# Patient Record
Sex: FEMALE | Race: WHITE | Employment: OTHER | URBAN - NONMETROPOLITAN AREA
[De-identification: names, ages, dates, MRNs, and addresses within clinical notes are randomized per-mention and may not be internally consistent; named-entity substitution may affect disease eponyms.]

---

## 2020-08-10 NOTE — PROGRESS NOTES
Subjective:   Chief Complaint:   Chief Complaint   Patient presents with   • HTN (Controlled)   • Hyperlipidemia         Estela Borden is a 70 y.o. female who is referred by pulmonologist, Dr. Abdifatah Gannon, for shortness of breath and cough.    She has been coughing since 2020 with running nose, referred to pulm.  Treated for sinus and post nasal drip.    Then started having leg swelling, right more than left.    Has been having SNOW, also started in Feb.  [facility] 3 SNOW. Gets chest tightness as well but no other CP.  Took lasix and K for 7 days, then PRN.  Lost 4 pounds of water weight, thinks it helped a little, helped with leg swelling.  Weight creeping back up.  No PFTs per pt.    No screening for CANDICE.    Has HTN, on med only, BP very high today.  HTN since prior to .  Intolerance to atenolol, dizzy, hearing loss, had only been taking it for 3 weeks.  Takes IB PRN, taking it only a few times per week.    Has hyperlipidemia, on simvastatin since .  in , . HDL ok.  No FU lipids.  Vit D def, on replacement.    No family history of premature coronary artery disease.  Brother  at 74, found 6 days later.  Father  at 84 of CHF.  No prior smoking history.  No history of diabetes. IFG.  No history of autoimmune disease such as lupus or rheumatoid arthritis.  No chronic kidney disease.  No ETOH overuse. Occasional.  No caffeine overuse.  No recreation substance use.    Feels neck and pulse pounding at night when she lays down, interferes with her falling asleep, not new.  No significant lightheadedness, or presyncope/syncope.   Gets vertigo.  No symptoms of leg claudication.  Cramps at night.  Had episode playing cards were fingers of left hand would not extend, was 1st through 4th digit.   No recent stroke/TIA like symptoms.        DATA REVIEWED by me:  ECG 2020  Sinus 70, PAC    Echo pending    Nuc [facility]  Told normal, requested.    Most recent labs:       Lab Results    Component Value Date/Time    HEMOGLOBIN 13.6 11/12/2015 09:03 AM    HEMATOCRIT 41.8 11/12/2015 09:03 AM    MCV 89 11/12/2015 09:03 AM      Lab Results   Component Value Date/Time    SODIUM 142 11/12/2015 09:03 AM    POTASSIUM 3.7 11/12/2015 09:03 AM    CHLORIDE 100 11/12/2015 09:03 AM    CO2 25 11/12/2015 09:03 AM    GLUCOSE 102 (H) 11/12/2015 09:03 AM    BUN 11 11/12/2015 09:03 AM    CREATININE 0.57 11/12/2015 09:03 AM      Lab Results   Component Value Date/Time    ASTSGOT 15 11/12/2015 09:03 AM    ALTSGPT 13 11/12/2015 09:03 AM    ALBUMIN 4.1 11/12/2015 09:03 AM      Lab Results   Component Value Date/Time    CHOLSTRLTOT 259 (H) 11/12/2015 09:03 AM     (H) 11/12/2015 09:03 AM    HDL 54 11/12/2015 09:03 AM    TRIGLYCERIDE 235 (H) 11/12/2015 09:03 AM           Past Medical History:   Diagnosis Date   • Abdominal cramping    • Anxiety    • Arthritis    • Asthma    • Constipation    • Depression    • Diarrhea    • Difficulty breathing    • Difficulty walking    • Excessive sweating    • Frequent urination at night    • Headache    • Heat intolerance    • Hypertension    • Insomnia    • Joint pain    • Light sensitivity    • Muscle pain    • Neck stiffness    • Numbness and tingling    • Restless sleeper    • Varicose vein      Past Surgical History:   Procedure Laterality Date   • KNEE REPLACEMENT, TOTAL  9/3/2014   • HYSTERECTOMY, TOTAL ABDOMINAL       Family History   Problem Relation Age of Onset   • Cancer Father         Bone   • Heart Disease Father         CHF   • Cancer Mother         Colon   • Arthritis Maternal Grandmother    • Stroke Maternal Grandfather    • Cancer Paternal Grandmother         Lymph node   • Diabetes Paternal Grandmother      Social History     Socioeconomic History   • Marital status:      Spouse name: Not on file   • Number of children: Not on file   • Years of education: Not on file   • Highest education level: Not on file   Occupational History   • Not on file   Social  Needs   • Financial resource strain: Not on file   • Food insecurity     Worry: Not on file     Inability: Not on file   • Transportation needs     Medical: Not on file     Non-medical: Not on file   Tobacco Use   • Smoking status: Never Smoker   • Smokeless tobacco: Never Used   Substance and Sexual Activity   • Alcohol use: Yes     Alcohol/week: 1.2 - 2.4 oz     Types: 1 - 2 Glasses of wine, 1 - 2 Shots of liquor per week     Comment: Moderately either wine or liquor   • Drug use: No   • Sexual activity: Not Currently   Lifestyle   • Physical activity     Days per week: Not on file     Minutes per session: Not on file   • Stress: Not on file   Relationships   • Social connections     Talks on phone: Not on file     Gets together: Not on file     Attends Buddhist service: Not on file     Active member of club or organization: Not on file     Attends meetings of clubs or organizations: Not on file     Relationship status: Not on file   • Intimate partner violence     Fear of current or ex partner: Not on file     Emotionally abused: Not on file     Physically abused: Not on file     Forced sexual activity: Not on file   Other Topics Concern   • Not on file   Social History Narrative   • Not on file     Allergies   Allergen Reactions   • Atenolol    • Naproxen    • Penicillins    • Sulfa Drugs        Current Outpatient Medications   Medication Sig Dispense Refill   • losartan (COZAAR) 25 MG Tab Take 25 mg by mouth.     • FLUoxetine (PROZAC) 20 MG Cap Take 20 mg by mouth.     • azelastine (ASTELIN) 137 MCG/SPRAY nasal spray USE 2 DOSES IN EACH NOSTRIL TWICE DAILY FOR 30 DAYS     • ipratropium (ATROVENT) 0.06 % Solution USE 2 SPRAY(S) IN EACH NOSTRIL TWICE DAILY FOR 30 DAYS     • furosemide (LASIX) 20 MG Tab Take 1 Tab by mouth every day. 100 Tab 3   • potassium chloride (MICRO-K) 10 MEQ capsule Take 1 Cap by mouth every day. 100 Cap 3   • simvastatin (ZOCOR) 20 MG Tab Take 1 Tab by mouth every evening. 90 Tab 1   •  ibuprofen (MOTRIN) 800 MG Tab Take 1 Tab by mouth every 8 hours as needed. 30 Tab 3   • omeprazole (PRILOSEC) 20 MG delayed-release capsule Take 1 Cap by mouth every day. 30 Cap 3   • albuterol (PROVENTIL HFA) 108 (90 BASE) MCG/ACT Aero Soln inhalation aerosol Inhale 2 Puffs by mouth every 6 hours as needed for Shortness of Breath. 8.5 g 3     No current facility-administered medications for this visit.        ROS  All others systems reviewed and negative.     Objective:     /80 (BP Location: Right arm, Patient Position: Sitting)   Pulse 74   Ht 1.524 m (5')   Wt 106.1 kg (234 lb)   SpO2 96%  Body mass index is 45.7 kg/m².    General: No acute distress. Well nourished.  HEENT: EOM grossly intact, no scleral icterus, no pharyngeal erythema.   Neck:  No JVD, no bruits, trachea midline  CVS: RRR, some ectopy. Normal S1, S2. No M/R/G. No LE edema.  2+ radial pulses, 2+ PT pulses  Resp: CTAB. No wheezing or crackles/rhonchi. Normal respiratory effort.  Abdomen: Soft, NT, no terri hepatomegaly.  MSK/Ext: No clubbing or cyanosis.  Skin: Warm and dry, no rashes.  Neurological: CN III-XII grossly intact. No focal deficits.   Psych: A&O x 3, appropriate affect, good judgement      Assessment:     1. Shortness of breath  EKG    EC-ECHOCARDIOGRAM COMPLETE W/O CONT    Basic Metabolic Panel   2. Cough     3. Essential hypertension  EKG    Basic Metabolic Panel   4. Vitamin D deficiency disease     5. Bilateral lower extremity edema  EC-ECHOCARDIOGRAM COMPLETE W/O CONT   6. Premature atrial contraction     7. IFG (impaired fasting glucose)     8. Mixed hyperlipidemia     9. Palpitations         Medical Decision Making:  Today's Assessment / Status / Plan:     -Needs echo, ordered today  -Records from [facility] requested  -Needs FU lipids and LFTs at some point  -Cont lasix scheduled with K and FU blood work  -BP not controlled, consider spironolactone  -Cough is unusual, glad she is going to an allergist  -Consider  zio for palps, had PACs  -Consider CANDICE eval  -RTC 3-4 weeks.    Written instructions given today:    -Echocardiogram- heart pictures to look at the heart structure and pump function.    -Consider Zio Patch, heart monitor, 2 weeks, evaluate the electrical system of the heart, I might ask you to do this after I see your echo    -You should always hear results of testing within 5 days with my interpretation, if you do not, send a [facility] message or call the office: [phone].    -Take lasix and potassium every day, non fasting blood work 5-7 days after to follow kidney function and potassium.    Checking Blood Pressure:  -Blood pressure cuff, spend in the $40-65, with good return policy  -It should be automatic, upper arm, measure your arm to get the correct size, probably adult Large  -Put the cuff in place, rest arm on table near height of your heart, sit quietly for 5 min, legs uncrossed, with back support, then take your blood pressure, write it down, keep a log  -Check no more than 1 time day, maybe 4-5 times per week, try different times of day.  -Can bring your cuff to at least one appointment where it can be calibrated to a manual cuff if you are concerned.  -Goal blood pressure is at least under 130/80, ideally under 120/80.  If you think your BP is overall too high, let us know in the office, we can adjust medications, can use [facility] or call the [place] office: [phone].    -Salt=sodium=sea salt, guidelines say stay under 2,500 mg daily but I ask for under 4,000 mg daily.  Get salt smart, start looking at labels, count it up.  Salt is hidden in everything, salad dressing, sauces, cheese, most canned food, any processed meat.    Return in about 4 weeks (around 9/11/2020).    It is my pleasure to participate in the care of Ms. Borden.  Please do not hesitate to contact me with questions or concerns.    Mary Tam MD, [facility]  Cardiologist [facility]    Please note  that this dictation was created using voice recognition software. I have made every reasonable attempt to correct obvious errors, but it is possible there are errors of grammar and possibly content that I did not discover before finalizing the note.

## 2020-08-14 ENCOUNTER — OFFICE VISIT (OUTPATIENT)
Dept: CARDIOLOGY | Facility: CLINIC | Age: 70
End: 2020-08-14
Payer: MEDICARE

## 2020-08-14 VITALS
DIASTOLIC BLOOD PRESSURE: 80 MMHG | SYSTOLIC BLOOD PRESSURE: 160 MMHG | HEART RATE: 74 BPM | HEIGHT: 60 IN | WEIGHT: 234 LBS | BODY MASS INDEX: 45.94 KG/M2 | OXYGEN SATURATION: 96 %

## 2020-08-14 DIAGNOSIS — E78.2 MIXED HYPERLIPIDEMIA: ICD-10-CM

## 2020-08-14 DIAGNOSIS — R60.0 BILATERAL LOWER EXTREMITY EDEMA: ICD-10-CM

## 2020-08-14 DIAGNOSIS — E55.9 VITAMIN D DEFICIENCY: ICD-10-CM

## 2020-08-14 DIAGNOSIS — R06.02 SHORTNESS OF BREATH: ICD-10-CM

## 2020-08-14 DIAGNOSIS — R00.2 PALPITATIONS: ICD-10-CM

## 2020-08-14 DIAGNOSIS — I10 ESSENTIAL HYPERTENSION: ICD-10-CM

## 2020-08-14 DIAGNOSIS — R05.9 COUGH: ICD-10-CM

## 2020-08-14 DIAGNOSIS — R73.01 IFG (IMPAIRED FASTING GLUCOSE): ICD-10-CM

## 2020-08-14 DIAGNOSIS — I49.1 PREMATURE ATRIAL CONTRACTION: ICD-10-CM

## 2020-08-14 LAB — EKG IMPRESSION: NORMAL

## 2020-08-14 PROCEDURE — 93000 ELECTROCARDIOGRAM COMPLETE: CPT | Performed by: INTERNAL MEDICINE

## 2020-08-14 PROCEDURE — 99204 OFFICE O/P NEW MOD 45 MIN: CPT | Performed by: INTERNAL MEDICINE

## 2020-08-14 RX ORDER — AZELASTINE 1 MG/ML
SPRAY, METERED NASAL
COMMUNITY
Start: 2020-05-31

## 2020-08-14 RX ORDER — POTASSIUM CHLORIDE 750 MG/1
10 CAPSULE, EXTENDED RELEASE ORAL DAILY
Qty: 100 CAP | Refills: 3 | Status: SHIPPED | OUTPATIENT
Start: 2020-08-14 | End: 2020-09-08 | Stop reason: SDUPTHER

## 2020-08-14 RX ORDER — FUROSEMIDE 20 MG/1
TABLET ORAL
COMMUNITY
Start: 2020-07-23 | End: 2020-08-14

## 2020-08-14 RX ORDER — IPRATROPIUM BROMIDE 42 UG/1
SPRAY, METERED NASAL
COMMUNITY
Start: 2020-07-10 | End: 2024-01-02 | Stop reason: SDUPTHER

## 2020-08-14 RX ORDER — POTASSIUM CHLORIDE 750 MG/1
TABLET, FILM COATED, EXTENDED RELEASE ORAL
COMMUNITY
Start: 2020-08-08 | End: 2020-08-14

## 2020-08-14 RX ORDER — FLUOXETINE HYDROCHLORIDE 20 MG/1
20 CAPSULE ORAL
COMMUNITY
Start: 2020-05-23 | End: 2023-06-20

## 2020-08-14 RX ORDER — FUROSEMIDE 20 MG/1
20 TABLET ORAL DAILY
Qty: 100 TAB | Refills: 3 | Status: SHIPPED | OUTPATIENT
Start: 2020-08-14 | End: 2021-08-17 | Stop reason: SDUPTHER

## 2020-08-14 RX ORDER — LOSARTAN POTASSIUM 25 MG/1
25 TABLET ORAL
COMMUNITY
Start: 2020-06-01 | End: 2020-09-08

## 2020-08-14 NOTE — PATIENT INSTRUCTIONS
-Echocardiogram- heart pictures to look at the heart structure and pump function.    -Consider Zio Patch, heart monitor, 2 weeks, evaluate the electrical system of the heart, I might ask you to do this after I see your echo    -You should always hear results of testing within 5 days with my interpretation, if you do not, send a [employer] message or call the office: [phone].    -Take lasix and potassium every day, non fasting blood work 5-7 days after to follow kidney function and potassium.    Checking Blood Pressure:  -Blood pressure cuff, spend in the $40-65, with good return policy  -It should be automatic, upper arm, measure your arm to get the correct size, probably adult Large  -Put the cuff in place, rest arm on table near height of your heart, sit quietly for 5 min, legs uncrossed, with back support, then take your blood pressure, write it down, keep a log  -Check no more than 1 time day, maybe 4-5 times per week, try different times of day.  -Can bring your cuff to at least one appointment where it can be calibrated to a manual cuff if you are concerned.  -Goal blood pressure is at least under 130/80, ideally under 120/80.  If you think your BP is overall too high, let us know in the office, we can adjust medications, can use [employer] or call the [place] office: [phone].    -Salt=sodium=sea salt, guidelines say stay under 2,500 mg daily but I ask for under 4,000 mg daily.  Get salt smart, start looking at labels, count it up.  Salt is hidden in everything, salad dressing, sauces, cheese, most canned food, any processed meat.

## 2020-08-14 NOTE — LETTER
[facility] [address],   [address] [place] [address]  Phone: [phone]  Fax: [phone]              Estela Borden  1950    Encounter Date: 2020    Mary Tam M.D.          PROGRESS NOTE:  Subjective:   Chief Complaint:   Chief Complaint   Patient presents with   • HTN (Controlled)   • Hyperlipidemia         Estela Borden is a 70 y.o. female who is referred by pulmonologist, Dr. Abdifatah Gannon, for shortness of breath and cough.    She has been coughing since 2020 with running nose, referred to pulm.  Treated for sinus and post nasal drip.    Then started having leg swelling, right more than left.    Has been having SNOW, also started in Feb.  [facility] 3 SNOW. Gets chest tightness as well but no other CP.  Took lasix and K for 7 days, then PRN.  Lost 4 pounds of water weight, thinks it helped a little, helped with leg swelling.  Weight creeping back up.  No PFTs per pt.    No screening for CANDICE.    Has HTN, on med only, BP very high today.  HTN since prior to .  Intolerance to atenolol, dizzy, hearing loss, had only been taking it for 3 weeks.  Takes IB PRN, taking it only a few times per week.    Has hyperlipidemia, on simvastatin since .  in , . HDL ok.  No FU lipids.  Vit D def, on replacement.    No family history of premature coronary artery disease.  Brother  at 74, found 6 days later.  Father  at 84 of CHF.  No prior smoking history.  No history of diabetes. IFG.  No history of autoimmune disease such as lupus or rheumatoid arthritis.  No chronic kidney disease.  No ETOH overuse. Occasional.  No caffeine overuse.  No recreation substance use.    Feels neck and pulse pounding at night when she lays down, interferes with her falling asleep, not new.  No significant lightheadedness, or presyncope/syncope.   Gets vertigo.  No symptoms of leg claudication.  Cramps at night.  Had  episode playing cards were fingers of left hand would not extend, was 1st through 4th digit.   No recent stroke/TIA like symptoms.        DATA REVIEWED by me:  ECG 8-  Sinus 70, PAC    Echo pending    Nuc [facility]  Told normal, requested.    Most recent labs:       Lab Results   Component Value Date/Time    HEMOGLOBIN 13.6 11/12/2015 09:03 AM    HEMATOCRIT 41.8 11/12/2015 09:03 AM    MCV 89 11/12/2015 09:03 AM      Lab Results   Component Value Date/Time    SODIUM 142 11/12/2015 09:03 AM    POTASSIUM 3.7 11/12/2015 09:03 AM    CHLORIDE 100 11/12/2015 09:03 AM    CO2 25 11/12/2015 09:03 AM    GLUCOSE 102 (H) 11/12/2015 09:03 AM    BUN 11 11/12/2015 09:03 AM    CREATININE 0.57 11/12/2015 09:03 AM      Lab Results   Component Value Date/Time    ASTSGOT 15 11/12/2015 09:03 AM    ALTSGPT 13 11/12/2015 09:03 AM    ALBUMIN 4.1 11/12/2015 09:03 AM      Lab Results   Component Value Date/Time    CHOLSTRLTOT 259 (H) 11/12/2015 09:03 AM     (H) 11/12/2015 09:03 AM    HDL 54 11/12/2015 09:03 AM    TRIGLYCERIDE 235 (H) 11/12/2015 09:03 AM           Past Medical History:   Diagnosis Date   • Abdominal cramping    • Anxiety    • Arthritis    • Asthma    • Constipation    • Depression    • Diarrhea    • Difficulty breathing    • Difficulty walking    • Excessive sweating    • Frequent urination at night    • Headache    • Heat intolerance    • Hypertension    • Insomnia    • Joint pain    • Light sensitivity    • Muscle pain    • Neck stiffness    • Numbness and tingling    • Restless sleeper    • Varicose vein      Past Surgical History:   Procedure Laterality Date   • KNEE REPLACEMENT, TOTAL  9/3/2014   • HYSTERECTOMY, TOTAL ABDOMINAL       Family History   Problem Relation Age of Onset   • Cancer Father         Bone   • Heart Disease Father         CHF   • Cancer Mother         Colon   • Arthritis Maternal Grandmother    • Stroke Maternal Grandfather    • Cancer Paternal Grandmother         Lymph node   •  Diabetes Paternal Grandmother      Social History     Socioeconomic History   • Marital status:      Spouse name: Not on file   • Number of children: Not on file   • Years of education: Not on file   • Highest education level: Not on file   Occupational History   • Not on file   Social Needs   • Financial resource strain: Not on file   • Food insecurity     Worry: Not on file     Inability: Not on file   • Transportation needs     Medical: Not on file     Non-medical: Not on file   Tobacco Use   • Smoking status: Never Smoker   • Smokeless tobacco: Never Used   Substance and Sexual Activity   • Alcohol use: Yes     Alcohol/week: 1.2 - 2.4 oz     Types: 1 - 2 Glasses of wine, 1 - 2 Shots of liquor per week     Comment: Moderately either wine or liquor   • Drug use: No   • Sexual activity: Not Currently   Lifestyle   • Physical activity     Days per week: Not on file     Minutes per session: Not on file   • Stress: Not on file   Relationships   • Social connections     Talks on phone: Not on file     Gets together: Not on file     Attends Episcopalian service: Not on file     Active member of club or organization: Not on file     Attends meetings of clubs or organizations: Not on file     Relationship status: Not on file   • Intimate partner violence     Fear of current or ex partner: Not on file     Emotionally abused: Not on file     Physically abused: Not on file     Forced sexual activity: Not on file   Other Topics Concern   • Not on file   Social History Narrative   • Not on file     Allergies   Allergen Reactions   • Atenolol    • Naproxen    • Penicillins    • Sulfa Drugs        Current Outpatient Medications   Medication Sig Dispense Refill   • losartan (COZAAR) 25 MG Tab Take 25 mg by mouth.     • FLUoxetine (PROZAC) 20 MG Cap Take 20 mg by mouth.     • azelastine (ASTELIN) 137 MCG/SPRAY nasal spray USE 2 DOSES IN EACH NOSTRIL TWICE DAILY FOR 30 DAYS     • ipratropium (ATROVENT) 0.06 % Solution USE 2  SPRAY(S) IN EACH NOSTRIL TWICE DAILY FOR 30 DAYS     • furosemide (LASIX) 20 MG Tab Take 1 Tab by mouth every day. 100 Tab 3   • potassium chloride (MICRO-K) 10 MEQ capsule Take 1 Cap by mouth every day. 100 Cap 3   • simvastatin (ZOCOR) 20 MG Tab Take 1 Tab by mouth every evening. 90 Tab 1   • ibuprofen (MOTRIN) 800 MG Tab Take 1 Tab by mouth every 8 hours as needed. 30 Tab 3   • omeprazole (PRILOSEC) 20 MG delayed-release capsule Take 1 Cap by mouth every day. 30 Cap 3   • albuterol (PROVENTIL HFA) 108 (90 BASE) MCG/ACT Aero Soln inhalation aerosol Inhale 2 Puffs by mouth every 6 hours as needed for Shortness of Breath. 8.5 g 3     No current facility-administered medications for this visit.        ROS  All others systems reviewed and negative.     Objective:     /80 (BP Location: Right arm, Patient Position: Sitting)   Pulse 74   Ht 1.524 m (5')   Wt 106.1 kg (234 lb)   SpO2 96%  Body mass index is 45.7 kg/m².    General: No acute distress. Well nourished.  HEENT: EOM grossly intact, no scleral icterus, no pharyngeal erythema.   Neck:  No JVD, no bruits, trachea midline  CVS: RRR, some ectopy. Normal S1, S2. No M/R/G. No LE edema.  2+ radial pulses, 2+ PT pulses  Resp: CTAB. No wheezing or crackles/rhonchi. Normal respiratory effort.  Abdomen: Soft, NT, no terri hepatomegaly.  MSK/Ext: No clubbing or cyanosis.  Skin: Warm and dry, no rashes.  Neurological: CN III-XII grossly intact. No focal deficits.   Psych: A&O x 3, appropriate affect, good judgement      Assessment:     1. Shortness of breath  EKG    EC-ECHOCARDIOGRAM COMPLETE W/O CONT    Basic Metabolic Panel   2. Cough     3. Essential hypertension  EKG    Basic Metabolic Panel   4. Vitamin D deficiency disease     5. Bilateral lower extremity edema  EC-ECHOCARDIOGRAM COMPLETE W/O CONT   6. Premature atrial contraction     7. IFG (impaired fasting glucose)     8. Mixed hyperlipidemia     9. Palpitations         Medical Decision Making:  Today's  Assessment / Status / Plan:     -Needs echo, ordered today  -Records from [facility] requested  -Needs FU lipids and LFTs at some point  -Cont lasix scheduled with K and FU blood work  -BP not controlled, consider spironolactone  -Cough is unusual, glad she is going to an allergist  -Consider zio for palps, had PACs  -Consider CANDICE eval  -RTC 3-4 weeks.    Written instructions given today:    -Echocardiogram- heart pictures to look at the heart structure and pump function.    -Consider Zio Patch, heart monitor, 2 weeks, evaluate the electrical system of the heart, I might ask you to do this after I see your echo    -You should always hear results of testing within 5 days with my interpretation, if you do not, send a [employer] message or call the office: [phone].    -Take lasix and potassium every day, non fasting blood work 5-7 days after to follow kidney function and potassium.    Checking Blood Pressure:  -Blood pressure cuff, spend in the $40-65, with good return policy  -It should be automatic, upper arm, measure your arm to get the correct size, probably adult Large  -Put the cuff in place, rest arm on table near height of your heart, sit quietly for 5 min, legs uncrossed, with back support, then take your blood pressure, write it down, keep a log  -Check no more than 1 time day, maybe 4-5 times per week, try different times of day.  -Can bring your cuff to at least one appointment where it can be calibrated to a manual cuff if you are concerned.  -Goal blood pressure is at least under 130/80, ideally under 120/80.  If you think your BP is overall too high, let us know in the office, we can adjust medications, can use [employer] or call the [employer] office: [phone].    -Salt=sodium=sea salt, guidelines say stay under 2,500 mg daily but I ask for under 4,000 mg daily.  Get salt smart, start looking at labels, count it up.  Salt is hidden in everything, salad dressing, sauces, cheese, most canned food, any  processed meat.    Return in about 4 weeks (around 9/11/2020).    It is my pleasure to participate in the care of Ms. Borden.  Please do not hesitate to contact me with questions or concerns.    Mary Tam MD, [facility]  Cardiologist [facility]    Please note that this dictation was created using voice recognition software. I have made every reasonable attempt to correct obvious errors, but it is possible there are errors of grammar and possibly content that I did not discover before finalizing the note.      Amor Oconnell D.O.  [address] [place] [address] Dr Georges [address] [place] [id number]  Via Fax: [phone]     Abdifatah Gannon M.D.  [address]  [place] [address]  Via Fax: [phone]

## 2020-09-08 ENCOUNTER — OFFICE VISIT (OUTPATIENT)
Dept: CARDIOLOGY | Facility: CLINIC | Age: 70
End: 2020-09-08
Payer: MEDICARE

## 2020-09-08 ENCOUNTER — TELEPHONE (OUTPATIENT)
Dept: CARDIOLOGY | Facility: CLINIC | Age: 70
End: 2020-09-08

## 2020-09-08 ENCOUNTER — NON-PROVIDER VISIT (OUTPATIENT)
Dept: CARDIOLOGY | Facility: CLINIC | Age: 70
End: 2020-09-08
Attending: INTERNAL MEDICINE
Payer: MEDICARE

## 2020-09-08 VITALS
HEIGHT: 60 IN | WEIGHT: 230 LBS | DIASTOLIC BLOOD PRESSURE: 78 MMHG | HEART RATE: 78 BPM | BODY MASS INDEX: 45.16 KG/M2 | OXYGEN SATURATION: 91 % | SYSTOLIC BLOOD PRESSURE: 148 MMHG

## 2020-09-08 DIAGNOSIS — R05.9 COUGH: ICD-10-CM

## 2020-09-08 DIAGNOSIS — R06.02 SHORTNESS OF BREATH: ICD-10-CM

## 2020-09-08 DIAGNOSIS — E78.2 MIXED HYPERLIPIDEMIA: ICD-10-CM

## 2020-09-08 DIAGNOSIS — I47.10 SVT (SUPRAVENTRICULAR TACHYCARDIA) (HCC): ICD-10-CM

## 2020-09-08 DIAGNOSIS — I49.1 PREMATURE ATRIAL CONTRACTION: ICD-10-CM

## 2020-09-08 DIAGNOSIS — E55.9 VITAMIN D DEFICIENCY: ICD-10-CM

## 2020-09-08 DIAGNOSIS — R00.2 PALPITATIONS: ICD-10-CM

## 2020-09-08 DIAGNOSIS — I10 ESSENTIAL HYPERTENSION: ICD-10-CM

## 2020-09-08 DIAGNOSIS — R60.0 BILATERAL LOWER EXTREMITY EDEMA: ICD-10-CM

## 2020-09-08 DIAGNOSIS — R73.01 IFG (IMPAIRED FASTING GLUCOSE): ICD-10-CM

## 2020-09-08 PROCEDURE — 99214 OFFICE O/P EST MOD 30 MIN: CPT | Performed by: INTERNAL MEDICINE

## 2020-09-08 RX ORDER — LOSARTAN POTASSIUM 50 MG/1
50 TABLET ORAL 2 TIMES DAILY
Qty: 180 TAB | Refills: 3 | Status: SHIPPED | OUTPATIENT
Start: 2020-09-08 | End: 2020-09-10 | Stop reason: SDUPTHER

## 2020-09-08 RX ORDER — POTASSIUM CHLORIDE 750 MG/1
20 CAPSULE, EXTENDED RELEASE ORAL DAILY
Qty: 180 CAP | Refills: 3 | Status: SHIPPED | OUTPATIENT
Start: 2020-09-08 | End: 2020-09-10 | Stop reason: SDUPTHER

## 2020-09-08 RX ORDER — OMEPRAZOLE 40 MG/1
CAPSULE, DELAYED RELEASE ORAL
COMMUNITY
Start: 2020-08-16 | End: 2023-03-08 | Stop reason: SDUPTHER

## 2020-09-08 RX ORDER — LOSARTAN POTASSIUM 50 MG/1
50 TABLET ORAL
COMMUNITY
Start: 2020-08-20 | End: 2020-09-08 | Stop reason: SDUPTHER

## 2020-09-08 NOTE — PATIENT INSTRUCTIONS
-Echocardiogram- heart pictures to look at the heart structure and pump function.    -Zio Patch, heart monitor, 2 weeks, evaluate the electrical system of the heart.    -Start taking losartan 50 mg AM and PM, take 1-2 hours before med time.    -When I get the heart monitor results I will have our RN reach out to find out what your BP has been and I will probably add a new medication such as Cardizem or spironolactone (depedning on the results of the test)    -You should always hear results of testing within 5 days with my interpretation, if you do not, send a [employer] message or call the office: [phone].    -Call [phone], ask for [facility], keep them on the phone with you until you have success.      -take 2 of your potassium 10 meq at the same time daily

## 2020-09-08 NOTE — LETTER
[facility] [place] [address],   [address]nd [address]  [place] [address]  Phone: [phone]  Fax: [phone]              Estela Borden  1950    Encounter Date: 9/8/2020    Mary Tam M.D.          PROGRESS NOTE:  Subjective:   Chief Complaint:   Chief Complaint   Patient presents with   • Shortness of Breath       Estela Borden is a 70 y.o. female who returns for shortness of breath and cough, also had HTN and palpitations.    She has been coughing since Feb 2020 with running nose, referred to pulm.  Treated for sinus and post nasal drip.    Then started having leg swelling, right more than left.    Has been having SNOW, also started in Feb.  [facility] 3 SNOW. Gets chest tightness as well but no other CP.  Took lasix and K for 7 days, then PRN.  Lost 4 pounds of water weight, thinks it helped a little, helped with leg swelling.  Weight creeping back up.  No PFTs per pt.  Prior echo 2018 at [facility], new echo pending.  I asked her to take lasix daily, she did note some fluid weight loss and thinks it may have helped with her SOB.    No screening for CANDICE.    Notes when she lays down to sleep that she can hear her pulse/HR and suspects BP is up.  HR was 128 and / 103.  Then at times 190/78, .  Takes losartan in the morning.    Has HTN, on med only, BP very high today.  HTN since prior to 2007.  Intolerance to atenolol, dizzy, hearing loss, had only been taking it for 3 weeks.  Takes IB PRN, taking it only a few times per week.    Feels neck and pulse pounding at night when she lays down, interferes with her falling asleep, not new.  No significant lightheadedness, or presyncope/syncope.   Gets vertigo.  No symptoms of leg claudication.  Cramps at night.  Had episode playing cards were fingers of left hand would not extend, was 1st through 4th digit.   No recent stroke/TIA like symptoms.    Has hyperlipidemia, on simvastatin  since .  in 2015, . HDL ok.  No FU lipids.  Vit D def, on replacement.    Needs to have knee replacement.  Has not taken IB for over a week due to HTN.    No family history of premature coronary artery disease.  Brother  at 74, found 6 days later.  Father  at 84 of CHF.  No prior smoking history.  No history of diabetes. IFG.  No history of autoimmune disease such as lupus or rheumatoid arthritis.  No chronic kidney disease.  No ETOH overuse. Occasional.  No caffeine overuse.  No recreation substance use.        DATA REVIEWED by me:  ECG 2020  Sinus 70, PAC    Echo CT 18   EF 60-65%, mod, LAE AV sclerosis    Nuc  CT 18   Normal perfusion, EF 63%    Most recent labs:       Lab Results   Component Value Date/Time    HEMOGLOBIN 13.6 2015 09:03 AM    HEMATOCRIT 41.8 2015 09:03 AM    MCV 89 2015 09:03 AM      Lab Results   Component Value Date/Time    SODIUM 142 2015 09:03 AM    POTASSIUM 3.7 2015 09:03 AM    CHLORIDE 100 2015 09:03 AM    CO2 25 2015 09:03 AM    GLUCOSE 102 (H) 2015 09:03 AM    BUN 11 2015 09:03 AM    CREATININE 0.57 2015 09:03 AM      Lab Results   Component Value Date/Time    ASTSGOT 15 2015 09:03 AM    ALTSGPT 13 2015 09:03 AM    ALBUMIN 4.1 2015 09:03 AM      Lab Results   Component Value Date/Time    CHOLSTRLTOT 259 (H) 2015 09:03 AM     (H) 2015 09:03 AM    HDL 54 2015 09:03 AM    TRIGLYCERIDE 235 (H) 2015 09:03 AM           Past Medical History:   Diagnosis Date   • Abdominal cramping    • Anxiety    • Arthritis    • Asthma    • Constipation    • Depression    • Diarrhea    • Difficulty breathing    • Difficulty walking    • Excessive sweating    • Frequent urination at night    • Headache    • Heat intolerance    • Hypertension    • Insomnia    • Joint pain    • Light sensitivity    • Muscle pain    • Neck stiffness    • Numbness and tingling    •  Restless sleeper    • Varicose vein      Past Surgical History:   Procedure Laterality Date   • KNEE REPLACEMENT, TOTAL  9/3/2014   • HYSTERECTOMY, TOTAL ABDOMINAL       Family History   Problem Relation Age of Onset   • Cancer Father         Bone   • Heart Disease Father         CHF   • Cancer Mother         Colon   • Arthritis Maternal Grandmother    • Stroke Maternal Grandfather    • Cancer Paternal Grandmother         Lymph node   • Diabetes Paternal Grandmother      Social History     Socioeconomic History   • Marital status:      Spouse name: Not on file   • Number of children: Not on file   • Years of education: Not on file   • Highest education level: Not on file   Occupational History   • Not on file   Social Needs   • Financial resource strain: Not on file   • Food insecurity     Worry: Not on file     Inability: Not on file   • Transportation needs     Medical: Not on file     Non-medical: Not on file   Tobacco Use   • Smoking status: Never Smoker   • Smokeless tobacco: Never Used   Substance and Sexual Activity   • Alcohol use: Yes     Alcohol/week: 1.2 - 2.4 oz     Types: 1 - 2 Glasses of wine, 1 - 2 Shots of liquor per week     Comment: Moderately either wine or liquor   • Drug use: No   • Sexual activity: Not Currently   Lifestyle   • Physical activity     Days per week: Not on file     Minutes per session: Not on file   • Stress: Not on file   Relationships   • Social connections     Talks on phone: Not on file     Gets together: Not on file     Attends [employer] service: Not on file     Active member of club or organization: Not on file     Attends meetings of clubs or organizations: Not on file     Relationship status: Not on file   • Intimate partner violence     Fear of current or ex partner: Not on file     Emotionally abused: Not on file     Physically abused: Not on file     Forced sexual activity: Not on file   Other Topics Concern   • Not on file   Social History Narrative   • Not on  file     Allergies   Allergen Reactions   • Atenolol    • Naproxen    • Penicillins    • Sulfa Drugs        Current Outpatient Medications   Medication Sig Dispense Refill   • omeprazole (PRILOSEC) 40 MG delayed-release capsule TAKE 1 CAPSULE BY MOUTH ONCE DAILY FOR 90 DAYS     • losartan (COZAAR) 50 MG Tab Take 1 Tab by mouth 2 Times a Day. 180 Tab 3   • FLUoxetine (PROZAC) 20 MG Cap Take 20 mg by mouth.     • azelastine (ASTELIN) 137 MCG/SPRAY nasal spray USE 2 DOSES IN EACH NOSTRIL TWICE DAILY FOR 30 DAYS     • ipratropium (ATROVENT) 0.06 % Solution USE 2 SPRAY(S) IN EACH NOSTRIL TWICE DAILY FOR 30 DAYS     • furosemide (LASIX) 20 MG Tab Take 1 Tab by mouth every day. 100 Tab 3   • potassium chloride (MICRO-K) 10 MEQ capsule Take 1 Cap by mouth every day. 100 Cap 3   • simvastatin (ZOCOR) 20 MG Tab Take 1 Tab by mouth every evening. 90 Tab 1   • ibuprofen (MOTRIN) 800 MG Tab Take 1 Tab by mouth every 8 hours as needed. 30 Tab 3   • albuterol (PROVENTIL HFA) 108 (90 BASE) MCG/ACT Aero Soln inhalation aerosol Inhale 2 Puffs by mouth every 6 hours as needed for Shortness of Breath. 8.5 g 3     No current facility-administered medications for this visit.        ROS    All others systems reviewed and negative.     Objective:     /78 (BP Location: Right arm, Patient Position: Sitting)   Pulse 78   Ht 1.524 m (5')   Wt 104.3 kg (230 lb)   SpO2 91%  Body mass index is 44.92 kg/m².    General: No acute distress. Well nourished.  HEENT: EOM grossly intact, no scleral icterus, no pharyngeal erythema.   Neck:  No JVD, no bruits, trachea midline  CVS: RRR, some ectopy. Normal S1, S2. No M/R/G. No LE edema.  2+ radial pulses, 2+ PT pulses  Resp: CTAB. No wheezing or crackles/rhonchi. Normal respiratory effort.  Abdomen: Soft, NT, no terri hepatomegaly.  MSK/Ext: No clubbing or cyanosis.  Skin: Warm and dry, no rashes.  Neurological: CN III-XII grossly intact. No focal deficits.   Psych: A&O x 3, appropriate affect,  good judgement    Physical exam performed today and unchanged, except what is noted, compared to 8-      Assessment:     1. Shortness of breath     2. Essential hypertension     3. Vitamin D deficiency disease     4. Bilateral lower extremity edema     5. Premature atrial contraction     6. IFG (impaired fasting glucose)     7. Mixed hyperlipidemia     8. Palpitations  ZIO PATCH MONITOR   9. Cough         Medical Decision Making:  Today's Assessment / Status / Plan:     -Cont lasblanca scheduled with K, I have requested blood work, sounds like it may have helped with her shortness of breath.  -Zio for palps, look for rhythm change  -BP not controlled, needs to be adjusted I want her to increase her losartan to BID while I get heart monitor, may go to coreg or cardizem depending on zio, consider spironolactone  -Cough is unusual, glad she is going to an allergist  -Consider CANDICE eval  -In terms of SNOW, consider cardio pulm testing but I would not do that until her BP is controlled and other things optimized.  -Needs FU lipids and LFTs at some point  -RTC 6 weeks.    Written instructions given today:      -Echocardiogram- heart pictures to look at the heart structure and pump function.    -Zio Patch, heart monitor, 2 weeks, evaluate the electrical system of the heart.    -Start taking losartan 50 mg AM and PM, take 1-2 hours before med time.    -When I get the heart monitor results I will have our RN reach out to find out what your BP has been and I will probably add a new medication such as Cardizem or spironolactone (depedning on the results of the test)    -You should always hear results of testing within 5 days with my interpretation, if you do not, send a [employer] message or call the office: [phone].    -Call [phone], ask for ilabt, keep them on the phone with you until you have success.    Return in about 6 weeks (around 10/20/2020).    It is my pleasure to participate in the care of Ms. Borden.   Please do not hesitate to contact me with questions or concerns.    Mary Tam MD, [facility]  Cardiologist [facility]    Please note that this dictation was created using voice recognition software. I have made every reasonable attempt to correct obvious errors, but it is possible there are errors of grammar and possibly content that I did not discover before finalizing the note.      SHAUN SilvaO.  [address] [place] [address] Dr Georges [facility] [place][note 2]9  [facility] [address]  Via Fax: [phone]

## 2020-09-08 NOTE — PROGRESS NOTES
Subjective:   Chief Complaint:   Chief Complaint   Patient presents with   • Shortness of Breath       Estela Borden is a 70 y.o. female who returns for shortness of breath and cough, also had HTN and palpitations.    She has been coughing since 2020 with running nose, referred to pulm.  Treated for sinus and post nasal drip.    Then started having leg swelling, right more than left.    Has been having SNOW, also started in Feb.  [facility] 3 SNOW. Gets chest tightness as well but no other CP.  Took lasix and K for 7 days, then PRN.  Lost 4 pounds of water weight, thinks it helped a little, helped with leg swelling.  Weight creeping back up.  No PFTs per pt.  Prior echo  at [facility] [place], new echo pending.  I asked her to take lasix daily, she did note some fluid weight loss and thinks it may have helped with her SOB.    FU K 3.5 so I am increasing this.    No screening for CANDICE.    Notes when she lays down to sleep that she can hear her pulse/HR and suspects BP is up.  HR was 128 and / 103.  Then at times 190/78, .  Takes losartan in the morning.    Has HTN, on med only, BP very high today.  HTN since prior to .  Intolerance to atenolol, dizzy, hearing loss, had only been taking it for 3 weeks.  Takes IB PRN, taking it only a few times per week.    Feels neck and pulse pounding at night when she lays down, interferes with her falling asleep, not new.  No significant lightheadedness, or presyncope/syncope.   Gets vertigo.  No symptoms of leg claudication.  Cramps at night.  Had episode playing cards were fingers of left hand would not extend, was 1st through 4th digit.   No recent stroke/TIA like symptoms.    Has hyperlipidemia, on simvastatin since .  in , . HDL ok.  No FU lipids.  Vit D def, on replacement.    Needs to have knee replacement.  Has not taken IB for over a week due to HTN.    No family history of premature coronary artery disease.  Brother  at 74, found 6  days later.  Father  at 84 of CHF.  No prior smoking history.  No history of diabetes. IFG.  No history of autoimmune disease such as lupus or rheumatoid arthritis.  No chronic kidney disease.  No ETOH overuse. Occasional.  No caffeine overuse.  No recreation substance use.        DATA REVIEWED by me:  ECG 2020  Sinus 70, PAC    Echo CT 18   EF 60-65%, mod, LAE AV sclerosis    Nuc  CT 18   Normal perfusion, EF 63%    Most recent labs:       Lab Results   Component Value Date/Time    HEMOGLOBIN 13.6 2015 09:03 AM    HEMATOCRIT 41.8 2015 09:03 AM    MCV 89 2015 09:03 AM      Lab Results   Component Value Date/Time    SODIUM 142 2015 09:03 AM    POTASSIUM 3.7 2015 09:03 AM    CHLORIDE 100 2015 09:03 AM    CO2 25 2015 09:03 AM    GLUCOSE 102 (H) 2015 09:03 AM    BUN 11 2015 09:03 AM    CREATININE 0.57 2015 09:03 AM      Lab Results   Component Value Date/Time    ASTSGOT 15 2015 09:03 AM    ALTSGPT 13 2015 09:03 AM    ALBUMIN 4.1 2015 09:03 AM      Lab Results   Component Value Date/Time    CHOLSTRLTOT 259 (H) 2015 09:03 AM     (H) 2015 09:03 AM    HDL 54 2015 09:03 AM    TRIGLYCERIDE 235 (H) 2015 09:03 AM           Past Medical History:   Diagnosis Date   • Abdominal cramping    • Anxiety    • Arthritis    • Asthma    • Constipation    • Depression    • Diarrhea    • Difficulty breathing    • Difficulty walking    • Excessive sweating    • Frequent urination at night    • Headache    • Heat intolerance    • Hypertension    • Insomnia    • Joint pain    • Light sensitivity    • Muscle pain    • Neck stiffness    • Numbness and tingling    • Restless sleeper    • Varicose vein      Past Surgical History:   Procedure Laterality Date   • KNEE REPLACEMENT, TOTAL  9/3/2014   • HYSTERECTOMY, TOTAL ABDOMINAL       Family History   Problem Relation Age of Onset   • Cancer Father         Bone   • Heart  Disease Father         CHF   • Cancer Mother         Colon   • Arthritis Maternal Grandmother    • Stroke Maternal Grandfather    • Cancer Paternal Grandmother         Lymph node   • Diabetes Paternal Grandmother      Social History     Socioeconomic History   • Marital status:      Spouse name: Not on file   • Number of children: Not on file   • Years of education: Not on file   • Highest education level: Not on file   Occupational History   • Not on file   Social Needs   • Financial resource strain: Not on file   • Food insecurity     Worry: Not on file     Inability: Not on file   • Transportation needs     Medical: Not on file     Non-medical: Not on file   Tobacco Use   • Smoking status: Never Smoker   • Smokeless tobacco: Never Used   Substance and Sexual Activity   • Alcohol use: Yes     Alcohol/week: 1.2 - 2.4 oz     Types: 1 - 2 Glasses of wine, 1 - 2 Shots of liquor per week     Comment: Moderately either wine or liquor   • Drug use: No   • Sexual activity: Not Currently   Lifestyle   • Physical activity     Days per week: Not on file     Minutes per session: Not on file   • Stress: Not on file   Relationships   • Social connections     Talks on phone: Not on file     Gets together: Not on file     Attends [employer] service: Not on file     Active member of club or organization: Not on file     Attends meetings of clubs or organizations: Not on file     Relationship status: Not on file   • Intimate partner violence     Fear of current or ex partner: Not on file     Emotionally abused: Not on file     Physically abused: Not on file     Forced sexual activity: Not on file   Other Topics Concern   • Not on file   Social History Narrative   • Not on file     Allergies   Allergen Reactions   • Atenolol    • Naproxen    • Penicillins    • Sulfa Drugs        Current Outpatient Medications   Medication Sig Dispense Refill   • omeprazole (PRILOSEC) 40 MG delayed-release capsule TAKE 1 CAPSULE BY MOUTH ONCE  DAILY FOR 90 DAYS     • losartan (COZAAR) 50 MG Tab Take 1 Tab by mouth 2 Times a Day. 180 Tab 3   • potassium chloride (MICRO-K) 10 MEQ capsule Take 2 Caps by mouth every day. 180 Cap 3   • FLUoxetine (PROZAC) 20 MG Cap Take 20 mg by mouth.     • azelastine (ASTELIN) 137 MCG/SPRAY nasal spray USE 2 DOSES IN EACH NOSTRIL TWICE DAILY FOR 30 DAYS     • ipratropium (ATROVENT) 0.06 % Solution USE 2 SPRAY(S) IN EACH NOSTRIL TWICE DAILY FOR 30 DAYS     • furosemide (LASIX) 20 MG Tab Take 1 Tab by mouth every day. 100 Tab 3   • simvastatin (ZOCOR) 20 MG Tab Take 1 Tab by mouth every evening. 90 Tab 1   • ibuprofen (MOTRIN) 800 MG Tab Take 1 Tab by mouth every 8 hours as needed. 30 Tab 3   • albuterol (PROVENTIL HFA) 108 (90 BASE) MCG/ACT Aero Soln inhalation aerosol Inhale 2 Puffs by mouth every 6 hours as needed for Shortness of Breath. 8.5 g 3     No current facility-administered medications for this visit.        ROS    All others systems reviewed and negative.     Objective:     /78 (BP Location: Right arm, Patient Position: Sitting)   Pulse 78   Ht 1.524 m (5')   Wt 104.3 kg (230 lb)   SpO2 91%  Body mass index is 44.92 kg/m².    General: No acute distress. Well nourished.  HEENT: EOM grossly intact, no scleral icterus, no pharyngeal erythema.   Neck:  No JVD, no bruits, trachea midline  CVS: RRR, some ectopy. Normal S1, S2. No M/R/G. No LE edema.  2+ radial pulses, 2+ PT pulses  Resp: CTAB. No wheezing or crackles/rhonchi. Normal respiratory effort.  Abdomen: Soft, NT, no terri hepatomegaly.  MSK/Ext: No clubbing or cyanosis.  Skin: Warm and dry, no rashes.  Neurological: CN III-XII grossly intact. No focal deficits.   Psych: A&O x 3, appropriate affect, good judgement    Physical exam performed today and unchanged, except what is noted, compared to 8-      Assessment:     1. Shortness of breath     2. Essential hypertension     3. Vitamin D deficiency disease     4. Bilateral lower extremity edema      5. Premature atrial contraction     6. IFG (impaired fasting glucose)     7. Mixed hyperlipidemia     8. Palpitations  ZIO PATCH MONITOR   9. Cough         Medical Decision Making:  Today's Assessment / Status / Plan:     -Cont lasix scheduled with K, I have requested blood work, sounds like it may have helped with her shortness of breath.  -Zio for palps, look for rhythm change  -BP not controlled, needs to be adjusted I want her to increase her losartan to BID while I get heart monitor, may go to coreg or cardizem depending on zio, consider spironolactone  -Cough is unusual, glad she is going to an allergist  -Consider CANDICE eval  -In terms of SNOW, consider cardio pulm testing but I would not do that until her BP is controlled and other things optimized.  -Needs FU lipids and LFTs at some point  -RTC 6 weeks.    Written instructions given today:      -Echocardiogram- heart pictures to look at the heart structure and pump function.    -Zio Patch, heart monitor, 2 weeks, evaluate the electrical system of the heart.    -Start taking losartan 50 mg AM and PM, take 1-2 hours before med time.    -When I get the heart monitor results I will have our RN reach out to find out what your BP has been and I will probably add a new medication such as Cardizem or spironolactone (depedning on the results of the test)    -You should always hear results of testing within 5 days with my interpretation, if you do not, send a [employer] message or call the office: [phone].    -Call [phone], ask for [employer] Energy[employer], keep them on the phone with you until you have success.    -take 2 of your potassium 10 meq at the same time daily    Return in about 6 weeks (around 10/20/2020).    It is my pleasure to participate in the care of Ms. Borden.  Please do not hesitate to contact me with questions or concerns.    Mary Tam MD, [facility]  Cardiologist [facility]    Please note that this dictation was  created using voice recognition software. I have made every reasonable attempt to correct obvious errors, but it is possible there are errors of grammar and possibly content that I did not discover before finalizing the note.

## 2020-09-10 RX ORDER — LOSARTAN POTASSIUM 50 MG/1
50 TABLET ORAL 2 TIMES DAILY
Qty: 180 TAB | Refills: 3 | Status: SHIPPED | OUTPATIENT
Start: 2020-09-10 | End: 2020-11-20

## 2020-09-10 RX ORDER — POTASSIUM CHLORIDE 750 MG/1
20 CAPSULE, EXTENDED RELEASE ORAL DAILY
Qty: 180 CAP | Refills: 3 | Status: SHIPPED | OUTPATIENT
Start: 2020-09-10 | End: 2021-09-08 | Stop reason: SDUPTHER

## 2020-09-28 PROCEDURE — 0296T PR EXT ECG > 48HR TO 21 DAY RCRD W/CONECT INTL RCRD: CPT | Performed by: INTERNAL MEDICINE

## 2020-09-28 PROCEDURE — 0298T PR EXT ECG > 48HR TO 21 DAY REVIEW AND INTERPRETATN: CPT | Performed by: INTERNAL MEDICINE

## 2020-10-01 ENCOUNTER — TELEPHONE (OUTPATIENT)
Dept: CARDIOLOGY | Facility: MEDICAL CENTER | Age: 70
End: 2020-10-01

## 2020-10-01 RX ORDER — DILTIAZEM HYDROCHLORIDE 120 MG/1
120 CAPSULE, COATED, EXTENDED RELEASE ORAL DAILY
Qty: 90 CAP | Refills: 3 | Status: SHIPPED | OUTPATIENT
Start: 2020-10-01 | End: 2020-11-20 | Stop reason: SDUPTHER

## 2020-10-01 NOTE — TELEPHONE ENCOUNTER
PT needs to talk to LS about her meds  Received: Today  Message Contents   Brooklyn Wells R.N.     Patient called. Pt states she was looking at My Chart. Pt pauses to read a text message. Then patient continues. Pt calling regarding My Chart message MD sent her. Pt would prefer to meet with MD first-she needs knee replacement by Dr. Chandler Dietz. Appt discussed. Pt is scheduled for 10/22. Pt informed I can get her in the week before in [place]. Pt declines. We discuss Afib/her monitor results and pt is agreeable to start medications prior to appt.    Medications ordered per MD requests after patient agreement and lengthy discussion.     [facility] Released Result Comments    Viewed by Estela Borden on 10/1/2020  8:44 AM  Written by Mary Tam M.D. on 9/30/2020  4:49 PM  Heart monitor with paroxysmal atrial fibrillation.   Chads 2 vascular score is 3 so apixaban 5 mg twice daily is recommended.   For symptom relief, add Cardizem 120 mg once daily.   Patient can start medication or offer follow-up with me in [facility] available.  I think I actually have some slots.   Thank you.

## 2020-10-22 ENCOUNTER — APPOINTMENT (OUTPATIENT)
Dept: CARDIOLOGY | Facility: CLINIC | Age: 70
End: 2020-10-22
Payer: MEDICARE

## 2020-11-20 ENCOUNTER — OFFICE VISIT (OUTPATIENT)
Dept: CARDIOLOGY | Facility: CLINIC | Age: 70
End: 2020-11-20
Payer: MEDICARE

## 2020-11-20 VITALS
DIASTOLIC BLOOD PRESSURE: 80 MMHG | BODY MASS INDEX: 37.49 KG/M2 | OXYGEN SATURATION: 94 % | HEIGHT: 65 IN | WEIGHT: 225 LBS | SYSTOLIC BLOOD PRESSURE: 140 MMHG | HEART RATE: 94 BPM

## 2020-11-20 DIAGNOSIS — I10 ESSENTIAL HYPERTENSION: ICD-10-CM

## 2020-11-20 DIAGNOSIS — R73.01 IFG (IMPAIRED FASTING GLUCOSE): ICD-10-CM

## 2020-11-20 DIAGNOSIS — E78.2 MIXED HYPERLIPIDEMIA: ICD-10-CM

## 2020-11-20 DIAGNOSIS — R05.9 COUGH: ICD-10-CM

## 2020-11-20 DIAGNOSIS — Z01.810 PREOP CARDIOVASCULAR EXAM: ICD-10-CM

## 2020-11-20 DIAGNOSIS — E55.9 VITAMIN D DEFICIENCY: ICD-10-CM

## 2020-11-20 DIAGNOSIS — R60.0 BILATERAL LOWER EXTREMITY EDEMA: ICD-10-CM

## 2020-11-20 DIAGNOSIS — I49.1 PREMATURE ATRIAL CONTRACTION: ICD-10-CM

## 2020-11-20 DIAGNOSIS — R06.02 SHORTNESS OF BREATH: ICD-10-CM

## 2020-11-20 DIAGNOSIS — I35.8 AORTIC VALVE SCLEROSIS: ICD-10-CM

## 2020-11-20 DIAGNOSIS — I47.10 SVT (SUPRAVENTRICULAR TACHYCARDIA) (HCC): ICD-10-CM

## 2020-11-20 DIAGNOSIS — R00.2 PALPITATIONS: ICD-10-CM

## 2020-11-20 DIAGNOSIS — I48.0 PAROXYSMAL ATRIAL FIBRILLATION (HCC): ICD-10-CM

## 2020-11-20 PROCEDURE — 99214 OFFICE O/P EST MOD 30 MIN: CPT | Performed by: INTERNAL MEDICINE

## 2020-11-20 RX ORDER — LOSARTAN POTASSIUM 100 MG/1
TABLET ORAL
COMMUNITY
Start: 2020-09-11 | End: 2021-02-17 | Stop reason: SDUPTHER

## 2020-11-20 RX ORDER — MONTELUKAST SODIUM 10 MG/1
TABLET ORAL
COMMUNITY
Start: 2020-07-01 | End: 2020-11-20

## 2020-11-20 RX ORDER — FLUTICASONE PROPIONATE 50 MCG
SPRAY, SUSPENSION (ML) NASAL
COMMUNITY
Start: 2020-10-17

## 2020-11-20 RX ORDER — DILTIAZEM HYDROCHLORIDE 120 MG/1
240 CAPSULE, COATED, EXTENDED RELEASE ORAL DAILY
Qty: 200 CAP | Refills: 3 | Status: SHIPPED | OUTPATIENT
Start: 2020-11-20

## 2020-11-20 NOTE — LETTER
[facility] [address],   [address]nd [address]  [place] [address]  Phone: [phone]  Fax: [phone]              Estela Borden  1950    Encounter Date: 11/20/2020    Mary Tam M.D.          PROGRESS NOTE:  Subjective:   Chief Complaint:   Chief Complaint   Patient presents with   • Atrial Fibrillation       Estela Borden is a 70 y.o. female who returns for shortness of breath, new PAF, HTN and palpitations.    Feels neck and pulse pounding at night when she lays down, interferes with her falling asleep, not new.  No significant lightheadedness, or presyncope/syncope.   Gets vertigo.  No symptoms of leg claudication.  Cramps at night.  Had episode playing cards were fingers of left hand would not extend, was 1st through 4th digit.   Zio w/ PAF.   We started cardizem 120 mg and palpitations are improved but still some break through a few times per week.    [id number] 3 so blood thinner recommended.  No stroke/TIA like symptoms.  She is on this now.    Had been having SNOW, also started in Feb.  [facility] 3 SNOW. Gets chest tightness as well but no other CP.  Then started having leg swelling, right more than left  Better on lasix and K.  RVSP 35 on echo.    Syst murmur with AV sclerosis, mild.    She has been coughing since Feb 2020 with running nose, referred to pulm.  Treated for sinus and post nasal drip.  No PFTs per pt.  Prior echo 2018 at [facility].    No screening for CANDICE.    Takes losartan in the morning.  Has HTN, on med only, BP better after starting losartan.  HTN since prior to 2007.  Intolerance to atenolol, dizzy, hearing loss, had only been taking it for 3 weeks.  Was taking IB PRN but stopped taking it.    Has hyperlipidemia, on simvastatin since 2015.  in 2015, . HDL ok.  No FU lipids yet,  Vit D def, on replacement.    Needs to have knee replacement.  Has not taken IB for over a week due to HTN.    No  family history of premature coronary artery disease.  Brother  at 74, found 6 days later.  Father  at 84 of CHF.  No prior smoking history.  No history of diabetes. IFG.  No history of autoimmune disease such as lupus or rheumatoid arthritis.  No chronic kidney disease.  No ETOH overuse. Occasional.  No caffeine overuse.  No recreation substance use.      DATA REVIEWED by me:  ECG 2020  Sinus 72, PAC    ZioPatch Summary: 2020  1. Sinus rhythm with appropriate heart rate range. Average 88, range 55 to 184 bpm.   2. Normal sinus node and AV node conduction normal. No pauses.   3. Rare PACs.   4. Rare PVCs.   5. 118 runs of SVT, up to 25 seconds.  Episodes of atrial fibrillation at 1:46 AM, 7:13 PM.   6.  3 patient triggers during sinus rhythm, symptoms reported include pounding.   Conclusion: Paroxysmal atrial fibrillation and SVT.    Echo 2020  Left ventricular ejection fraction is visually estimated to be 75%.  Aortic valve sclerosis with reduced excursion without stenosis.  Estimated right ventricular systolic pressure  is 35 mmHg.  No prior study is available for comparison.     Echo CT 18   EF 60-65%, mod, LAE AV sclerosis    Nuc  CT 18   Normal perfusion, EF 63%    Most recent labs:       Lab Results   Component Value Date/Time    HEMOGLOBIN 13.6 2015 09:03 AM    HEMATOCRIT 41.8 2015 09:03 AM    MCV 89 2015 09:03 AM      Lab Results   Component Value Date/Time    SODIUM 142 2015 09:03 AM    POTASSIUM 3.7 2015 09:03 AM    CHLORIDE 100 2015 09:03 AM    CO2 25 2015 09:03 AM    GLUCOSE 102 (H) 2015 09:03 AM    BUN 11 2015 09:03 AM    CREATININE 0.57 2015 09:03 AM      Lab Results   Component Value Date/Time    ASTSGOT 15 2015 09:03 AM    ALTSGPT 13 2015 09:03 AM    ALBUMIN 4.1 2015 09:03 AM      Lab Results   Component Value Date/Time    CHOLSTRLTOT 259 (H) 2015 09:03 AM     (H) 2015  09:03 AM    HDL 54 11/12/2015 09:03 AM    TRIGLYCERIDE 235 (H) 11/12/2015 09:03 AM           Past Medical History:   Diagnosis Date   • Abdominal cramping    • Anxiety    • Arthritis    • Asthma    • Constipation    • Depression    • Diarrhea    • Difficulty breathing    • Difficulty walking    • Excessive sweating    • Frequent urination at night    • Headache    • Heat intolerance    • Hypertension    • Insomnia    • Joint pain    • Light sensitivity    • Muscle pain    • Neck stiffness    • Numbness and tingling    • Restless sleeper    • Varicose vein      Past Surgical History:   Procedure Laterality Date   • KNEE REPLACEMENT, TOTAL  9/3/2014   • HYSTERECTOMY, TOTAL ABDOMINAL       Family History   Problem Relation Age of Onset   • Cancer Father         Bone   • Heart Disease Father         CHF   • Cancer Mother         Colon   • Arthritis Maternal Grandmother    • Stroke Maternal Grandfather    • Cancer Paternal Grandmother         Lymph node   • Diabetes Paternal Grandmother      Social History     Socioeconomic History   • Marital status:      Spouse name: Not on file   • Number of children: Not on file   • Years of education: Not on file   • Highest education level: Not on file   Occupational History   • Not on file   Social Needs   • Financial resource strain: Not on file   • Food insecurity     Worry: Not on file     Inability: Not on file   • Transportation needs     Medical: Not on file     Non-medical: Not on file   Tobacco Use   • Smoking status: Never Smoker   • Smokeless tobacco: Never Used   Substance and Sexual Activity   • Alcohol use: Yes     Alcohol/week: 1.2 - 2.4 oz     Types: 1 - 2 Glasses of wine, 1 - 2 Shots of liquor per week     Comment: Moderately either wine or liquor   • Drug use: No   • Sexual activity: Not Currently   Lifestyle   • Physical activity     Days per week: Not on file     Minutes per session: Not on file   • Stress: Not on file   Relationships   • Social  connections     Talks on phone: Not on file     Gets together: Not on file     Attends Church service: Not on file     Active member of club or organization: Not on file     Attends meetings of clubs or organizations: Not on file     Relationship status: Not on file   • Intimate partner violence     Fear of current or ex partner: Not on file     Emotionally abused: Not on file     Physically abused: Not on file     Forced sexual activity: Not on file   Other Topics Concern   • Not on file   Social History Narrative   • Not on file     Allergies   Allergen Reactions   • Atenolol    • Naproxen    • Penicillins    • Sulfa Drugs        Current Outpatient Medications   Medication Sig Dispense Refill   • losartan (COZAAR) 100 MG Tab TAKE 1 2 (ONE HALF) TABLET BY MOUTH TWICE DAILY     • fluticasone (FLONASE) 50 MCG/ACT nasal spray USE 2 SPRAY(S) IN EACH NOSTRIL ONCE DAILY FOR 90 DAYS     • DILTIAZem CD (CARDIZEM CD) 120 MG CAPSULE SR 24 HR Take 2 Caps by mouth every day. 200 Cap 3   • apixaban (ELIQUIS) 5mg Tab Take 1 Tab by mouth 2 Times a Day. 180 Tab 3   • potassium chloride (MICRO-K) 10 MEQ capsule Take 2 Caps by mouth every day. 180 Cap 3   • omeprazole (PRILOSEC) 40 MG delayed-release capsule TAKE 1 CAPSULE BY MOUTH ONCE DAILY FOR 90 DAYS     • FLUoxetine (PROZAC) 20 MG Cap Take 20 mg by mouth.     • azelastine (ASTELIN) 137 MCG/SPRAY nasal spray USE 2 DOSES IN EACH NOSTRIL TWICE DAILY FOR 30 DAYS     • ipratropium (ATROVENT) 0.06 % Solution USE 2 SPRAY(S) IN EACH NOSTRIL TWICE DAILY FOR 30 DAYS     • furosemide (LASIX) 20 MG Tab Take 1 Tab by mouth every day. 100 Tab 3   • albuterol (PROVENTIL HFA) 108 (90 BASE) MCG/ACT Aero Soln inhalation aerosol Inhale 2 Puffs by mouth every 6 hours as needed for Shortness of Breath. 8.5 g 3     No current facility-administered medications for this visit.        ROS    All others systems reviewed and negative.     Objective:     /80 (BP Location: Left arm, Patient  "Position: Sitting, BP Cuff Size: Adult)   Pulse 94   Ht 1.651 m (5' 5\")   Wt 102.1 kg (225 lb)   SpO2 94%  Body mass index is 37.44 kg/m².    General: No acute distress. Well nourished.  HEENT: EOM grossly intact, no scleral icterus, no pharyngeal erythema.   Neck:  No JVD, no bruits, trachea midline  CVS: RRR, some ectopy. Normal S1, S2. 1/6 sys murmur, No LE edema.  2+ radial pulses, 2+ PT pulses  Resp: CTAB. No wheezing or crackles/rhonchi. Normal respiratory effort.  Abdomen: Soft, NT, no terri hepatomegaly.  MSK/Ext: No clubbing or cyanosis.  Skin: Warm and dry, no rashes.  Neurological: CN III-XII grossly intact. No focal deficits.   Psych: A&O x 3, appropriate affect, good judgement    Physical exam performed today and unchanged, except what is noted, compared to 9-8-2020    Assessment:     1. Paroxysmal atrial fibrillation (HCC)     2. Palpitations     3. SVT (supraventricular tachycardia) (HCC)     4. Essential hypertension     5. Mixed hyperlipidemia     6. Vitamin D deficiency disease     7. IFG (impaired fasting glucose)     8. Premature atrial contraction     9. Shortness of breath     10. Bilateral lower extremity edema     11. Cough     12. Preop cardiovascular exam     13. Aortic valve sclerosis         Medical Decision Making:  Today's Assessment / Status / Plan:     -Cont lasix scheduled with K, has helped modestly but still some cough and SNOW.  -Some break through afib, increase cardizem, see below  -CHADS2 vasc of 3, no prior TIA/CVA  -BP better, not yet there but much better.  -Cough is unusual, glad she is going to an allergist  -Letter today for surgery, ok to hold apix w/o bridging  -Needs FU lipids and LFTs at some point, can be done with PCP or next cardiologist  -She is changing to Barbi, will try to see Dr. Rubin Quiñonez    Written instructions given today:    Price check these medications which do not require routine blood testing:  *Eliquis=Apixaban 5 mg AM and " PM  *Xarelto=Rivaroxaban 20 mg once daily with food  *Pradaxa=Dabigatran 150 mg AM and PM    If the above medications are not affordable, I will refer you to a clinical pharmacist in the anticoagulation clinic to discuss the following:  *Warfarin= starts 5 mg daily, needs blood testing    -Try increasing Cardizem from 120 mg to 240 mg, if you feel better, a new prescription can be send, if you feel worse, go back to 120 mg daily.      Return in about 6 months (around 5/20/2021).    It is my pleasure to participate in the care of Ms. Borden.  Please do not hesitate to contact me with questions or concerns.    Mary Tam MD, [facility]  Cardiologist [facility]    Please note that this dictation was created using voice recognition software. I have made every reasonable attempt to correct obvious errors, but it is possible there are errors of grammar and possibly content that I did not discover before finalizing the note.        SHAUN SilvaO.  [address] [place] [facility]  [facility] [address]  Via Fax: [phone]

## 2020-11-20 NOTE — LETTER
PROCEDURE/SURGERY CLEARANCE FORM      Encounter Date: 11/20/2020    Patient: Estela Borden  YOB: 1950    CARDIOLOGIST:  Mary Tam M.D.    REFERRING DOCTOR:   Dr. Deric Poole    PATIENT does not have  PPM.    PATIENT does not have  AICD.    The above patient is NOT HIGH RISK to have the following procedure/surgery: Knee surgery with general anesthesia                                           Additional comments: OK to hold Eliquis 48 hours prior, if spine injections than per protocol/guidelines, no bridging.                 MD Pretty Tam M.D.       I cannot release the above patient for the following procedure/surgery without a cardiology evaluation:                                MD Pretty Tam M.D.

## 2021-02-05 DIAGNOSIS — E78.2 MIXED HYPERLIPIDEMIA: ICD-10-CM

## 2021-02-05 RX ORDER — ROSUVASTATIN CALCIUM 10 MG/1
10 TABLET, COATED ORAL EVERY EVENING
Qty: 90 TAB | Refills: 1 | Status: SHIPPED | OUTPATIENT
Start: 2021-02-05 | End: 2021-11-04 | Stop reason: SDUPTHER

## 2021-02-17 DIAGNOSIS — I10 ESSENTIAL HYPERTENSION: ICD-10-CM

## 2021-02-17 RX ORDER — LOSARTAN POTASSIUM 100 MG/1
TABLET ORAL
Qty: 90 TABLET | Refills: 2 | Status: SHIPPED | OUTPATIENT
Start: 2021-02-17 | End: 2021-07-06 | Stop reason: SDUPTHER

## 2021-06-04 DIAGNOSIS — E78.2 MIXED HYPERLIPIDEMIA: ICD-10-CM

## 2021-06-04 RX ORDER — ROSUVASTATIN CALCIUM 10 MG/1
10 TABLET, COATED ORAL EVERY EVENING
Qty: 100 TABLET | Refills: 0 | OUTPATIENT
Start: 2021-06-04

## 2021-07-06 DIAGNOSIS — I10 ESSENTIAL HYPERTENSION: ICD-10-CM

## 2021-07-07 RX ORDER — LOSARTAN POTASSIUM 100 MG/1
TABLET ORAL
Qty: 100 TABLET | Refills: 0 | Status: SHIPPED | OUTPATIENT
Start: 2021-07-07 | End: 2023-06-20

## 2021-11-04 DIAGNOSIS — E78.2 MIXED HYPERLIPIDEMIA: ICD-10-CM

## 2021-11-04 RX ORDER — ROSUVASTATIN CALCIUM 10 MG/1
10 TABLET, COATED ORAL EVERY EVENING
Qty: 90 TABLET | Refills: 0 | Status: SHIPPED | OUTPATIENT
Start: 2021-11-04

## 2022-01-05 DIAGNOSIS — I10 ESSENTIAL HYPERTENSION: ICD-10-CM

## 2022-01-10 RX ORDER — DILTIAZEM HYDROCHLORIDE 120 MG/1
CAPSULE, EXTENDED RELEASE ORAL
Qty: 60 CAPSULE | Refills: 0 | Status: SHIPPED | OUTPATIENT
Start: 2022-01-10

## 2023-05-16 NOTE — TELEPHONE ENCOUNTER
Patient enrolled in the Zio patch program.    >Currently pending EOS.   Simple: Patient demonstrates quick and easy understanding